# Patient Record
Sex: FEMALE | Race: WHITE
[De-identification: names, ages, dates, MRNs, and addresses within clinical notes are randomized per-mention and may not be internally consistent; named-entity substitution may affect disease eponyms.]

---

## 2018-01-05 ENCOUNTER — HOSPITAL ENCOUNTER (EMERGENCY)
Dept: HOSPITAL 59 - ER | Age: 58
Discharge: HOME | End: 2018-01-05
Payer: COMMERCIAL

## 2018-01-05 DIAGNOSIS — H92.02: ICD-10-CM

## 2018-01-05 DIAGNOSIS — J06.9: Primary | ICD-10-CM

## 2018-01-05 PROCEDURE — 99282 EMERGENCY DEPT VISIT SF MDM: CPT

## 2018-01-05 NOTE — EMERGENCY DEPARTMENT RECORD
History of Present Illness





- General


Chief complaint: ENT


Stated complaint: POSSIBLE EAR INFECTION


Time Seen by Provider: 18 17:31


Source: Patient


Mode of Arrival: Ambulatory


Limitations: No limitations





- History of Present Illness


Initial comments: 


The patient is here due to L ear and throat pain for 2-3 days. She denies any 

cough, fever, or chills but has had mild head congestion. The patient does have 

a hx of L ear tinnitis and is normally unable to clear the L ear with blowing 

her nose. She is concerned that she has a L ear infection.





MD complaint: Other


Onset/Timin


-: Days(s)


Location: L ear


Severity: Mild


Severity scale (1-10): 3


Quality: Other


Consistency: Constant


Improves with: None


Worsens with: None


Associated Symptoms: Sore throat, Tinnitus





- Related Data


 Previous Rx's











 Medication  Instructions  Recorded


 


Amoxicillin 500Mg Capsule [Amoxil] 500 mg PO TID 30 Days #30 tab 18


 


Prednisone [Prednisone 20Mg] 40 mg PO DAILY #10 tab 18











 Allergies











Allergy/AdvReac Type Severity Reaction Status Date / Time


 


morphine Allergy  NAUSEA AND Verified 18 17:30





   VOMITING  














Travel Screening





- Travel/Exposure Within Last 30 Days


Have you traveled within the last 30 days?: No





Review of Systems


Constitutional: Denies: Chills, Fever


Eyes: Denies: Eye discharge


ENT: Reports: Congestion, Ear pain


Respiratory: Denies: Cough, Dyspnea





Past Medical History





- SOCIAL HISTORY


Smoking Status: Never smoker


Alcohol Use: None


Drug Use: None





- RESPIRATORY


Hx Respiratory Disorders: No





- CARDIOVASCULAR


Hx Cardio Disorders: No





- NEURO


Hx Neuro Disorders: No





- GI


Hx GI Disorders: No





- 


Hx Genitourinary Disorders: No





- ENDOCRINE


Hx Endocrine Disorders: No





- MUSCULOSKELETAL


Hx Musculoskeletal Disorders: No





- PSYCH


Hx Psych Problems: No





- HEMATOLOGY/ONCOLOGY


Hx Hematology/Oncology Disorders: No





Family Medical History


Any Significant Family History?: No





Physical Exam





- General


General Appearance: Alert, Oriented x3, Cooperative, No acute distress





- Head


Head exam: Atraumatic, Normocephalic, Normal inspection





- Eye


Eye exam: Normal appearance, PERRL





- ENT


ENT exam: negative: Normal exam, Normal orophraynx, TM's normal bilaterally (

The L TM may have a mild effusion.)


Throat exam: Tonsillar erythema.  negative: Normal inspection, Tonsillomegaly, 

Tonsillar exudate, R peritonsillar mass, L peritonsillar mass





- Neck


Neck exam: Normal inspection, Full ROM.  negative: Lymphadenopathy, Meningismus

, Tenderness





- Respiratory


Respiratory exam: Normal lung sounds bilaterally.  negative: Respiratory 

distress





- Cardiovascular


Cardiovascular Exam: Regular rate, Normal rhythm, Normal heart sounds





Course





 Vital Signs











  18





  17:22


 


Temperature 99.0 F


 


Pulse Rate 84


 


Respiratory 14





Rate 


 


Blood Pressure 120/65


 


Pulse Ox 95














- Reevaluation(s)


Reevaluation #1: 


I explained to the patient that it appears that she has a viral URI. We will 

add Prednisone to the patient's Flonase and Zyrtec-D and will have her F/U with 

her PCP next week if not better. If her ear pain worsens or it it not better in 

3 days she is to take the script for AMox. that we will give her.


18 17:45





18 18:19








Disposition


Disposition: Discharge


Clinical Impression: 


Upper respiratory infection


Qualifiers:


 URI type: unspecified URI Qualified Code(s): J06.9 - Acute upper respiratory 

infection, unspecified





Disposition: Home, Self-Care


Condition: (2) Stable


Instructions:  Upper Respiratory Infection (ED)


Additional Instructions: 


Please continue your home medicines and add the Prednisone as directed for 5 

days. Please take the AMox. if the L ear is not better in 2-3 days or if your 

symptoms worsen. Please see your PCP if not better in 3-4 days.


Prescriptions: 


Amoxicillin 500Mg Capsule [Amoxil] 500 mg PO TID 30 Days #30 tab


Prednisone [Prednisone 20Mg] 40 mg PO DAILY #10 tab


Forms:  Patient Portal Access


Time of Disposition: 17:49





Quality





- Quality Measures


Quality Measures: N/A





- Blood Pressure Screening


View Details: Yes


Does Patient Have Any of the Following: No


Blood Pressure Classification: Pre-Hypertensive BP Reading


Systolic Measurement: 120


Diastolic Measurement: 65


Screening for High Blood Pressure: < Pre-Hypertensive BP, F/U Documented > [

]


Pre-Hypertensive Follow-up Interventions: Referral to alternative/primary care 

provider.